# Patient Record
Sex: MALE | Race: WHITE
[De-identification: names, ages, dates, MRNs, and addresses within clinical notes are randomized per-mention and may not be internally consistent; named-entity substitution may affect disease eponyms.]

---

## 2022-05-16 ENCOUNTER — HOSPITAL ENCOUNTER (OUTPATIENT)
Dept: HOSPITAL 47 - RADXRMAIN | Age: 67
Discharge: HOME | End: 2022-05-16
Attending: FAMILY MEDICINE
Payer: MEDICARE

## 2022-05-16 DIAGNOSIS — M17.12: Primary | ICD-10-CM

## 2022-05-16 NOTE — XR
EXAMINATION TYPE: XR knee 4V LT

 

DATE OF EXAM: 5/16/2022

 

COMPARISON: NONE

 

HISTORY: Pain

 

TECHNIQUE:

Three views are submitted.

 

FINDINGS:

There is narrowing of the medial compartment knee joint with hypertrophic spurring. Narrowing of the 
patellofemoral joint with spurring noted. Vascular calcifications noted. Small amount of fluid in the
 suprapatellar bursa..  Osseous structures are intact.  No acute fracture seen.  

 

IMPRESSION:

1. Osteoarthritis.

## 2022-08-12 ENCOUNTER — APPOINTMENT (OUTPATIENT)
Dept: URBAN - NONMETROPOLITAN AREA CLINIC 53 | Age: 67
Setting detail: DERMATOLOGY
End: 2022-08-15

## 2022-08-12 VITALS — WEIGHT: 185 LBS | HEIGHT: 70 IN

## 2022-08-12 DIAGNOSIS — L72.3 SEBACEOUS CYST: ICD-10-CM

## 2022-08-12 DIAGNOSIS — D22 MELANOCYTIC NEVI: ICD-10-CM

## 2022-08-12 DIAGNOSIS — L82.0 INFLAMED SEBORRHEIC KERATOSIS: ICD-10-CM

## 2022-08-12 DIAGNOSIS — D49.2 NEOPLASM OF UNSPECIFIED BEHAVIOR OF BONE, SOFT TISSUE, AND SKIN: ICD-10-CM

## 2022-08-12 DIAGNOSIS — B07.8 OTHER VIRAL WARTS: ICD-10-CM

## 2022-08-12 PROBLEM — D22.5 MELANOCYTIC NEVI OF TRUNK: Status: ACTIVE | Noted: 2022-08-12

## 2022-08-12 PROCEDURE — 17110 DESTRUCT B9 LESION 1-14: CPT

## 2022-08-12 PROCEDURE — OTHER MIPS QUALITY: OTHER

## 2022-08-12 PROCEDURE — OTHER COUNSELING: OTHER

## 2022-08-12 PROCEDURE — 99203 OFFICE O/P NEW LOW 30 MIN: CPT | Mod: 25

## 2022-08-12 PROCEDURE — OTHER BIOPSY BY SHAVE METHOD: OTHER

## 2022-08-12 PROCEDURE — OTHER LIQUID NITROGEN: OTHER

## 2022-08-12 PROCEDURE — 11102 TANGNTL BX SKIN SINGLE LES: CPT | Mod: 59

## 2022-08-12 PROCEDURE — OTHER ADDITIONAL NOTES: OTHER

## 2022-08-12 ASSESSMENT — LOCATION SIMPLE DESCRIPTION DERM
LOCATION SIMPLE: RIGHT RING FINGER
LOCATION SIMPLE: LEFT INDEX FINGER
LOCATION SIMPLE: LEFT MIDDLE FINGER
LOCATION SIMPLE: CHEST
LOCATION SIMPLE: LEFT UPPER ARM
LOCATION SIMPLE: RIGHT HAND
LOCATION SIMPLE: RIGHT UPPER BACK
LOCATION SIMPLE: LEFT HAND
LOCATION SIMPLE: LEFT RING FINGER
LOCATION SIMPLE: LEFT PRETIBIAL REGION

## 2022-08-12 ASSESSMENT — LOCATION ZONE DERM
LOCATION ZONE: TRUNK
LOCATION ZONE: LEG
LOCATION ZONE: HAND
LOCATION ZONE: FINGER
LOCATION ZONE: ARM

## 2022-08-12 ASSESSMENT — LOCATION DETAILED DESCRIPTION DERM
LOCATION DETAILED: RIGHT RADIAL PALM
LOCATION DETAILED: LEFT RADIAL PALM
LOCATION DETAILED: LEFT MEDIAL SUPERIOR CHEST
LOCATION DETAILED: RIGHT SUPERIOR MEDIAL UPPER BACK
LOCATION DETAILED: LEFT PROXIMAL PALMAR RING FINGER
LOCATION DETAILED: LEFT PROXIMAL PALMAR MIDDLE FINGER
LOCATION DETAILED: LEFT DISTAL PALMAR INDEX FINGER
LOCATION DETAILED: RIGHT DISTAL PALMAR RING FINGER
LOCATION DETAILED: LEFT DISTAL PRETIBIAL REGION
LOCATION DETAILED: LEFT PROXIMAL POSTERIOR UPPER ARM

## 2022-08-12 NOTE — HPI: BUMPS
How Severe Are Your Bumps?: mild
Have Your Bumps Been Treated?: been treated
Is This A New Presentation, Or A Follow-Up?: Bump
When Was It Treated?: 2020

## 2022-08-12 NOTE — PROCEDURE: BIOPSY BY SHAVE METHOD
Anesthesia Volume In Cc (Will Not Render If 0): 0.5
Validate Anticipated Plan: No
Anesthesia Type: 1% lidocaine with epinephrine
Size Of Lesion In Cm: 0
Post-Care Instructions: I reviewed with the patient in detail post-care instructions. Patient is to keep the biopsy site dry overnight, and then apply bacitracin twice daily until healed. Patient may apply hydrogen peroxide soaks to remove any crusting.
Wound Care: Petrolatum
Information: Selecting Yes will display possible errors in your note based on the variables you have selected. This validation is only offered as a suggestion for you. PLEASE NOTE THAT THE VALIDATION TEXT WILL BE REMOVED WHEN YOU FINALIZE YOUR NOTE. IF YOU WANT TO FAX A PRELIMINARY NOTE YOU WILL NEED TO TOGGLE THIS TO 'NO' IF YOU DO NOT WANT IT IN YOUR FAXED NOTE.
Biopsy Method: Dermablade
Silver Nitrate Text: The wound bed was treated with silver nitrate after the biopsy was performed.
Curettage Text: The wound bed was treated with curettage after the biopsy was performed.
Dressing: bandage
Billing Type: Third-Party Bill
Cryotherapy Text: The wound bed was treated with cryotherapy after the biopsy was performed.
Biopsy Type: H and E
Type Of Destruction Used: Curettage
Consent: Written consent was obtained and risks were reviewed including but not limited to scarring, infection, bleeding, scabbing, incomplete removal, nerve damage and allergy to anesthesia.
Depth Of Biopsy: dermis
Detail Level: Detailed
Hemostasis: Drysol
Notification Instructions: Patient will be notified of biopsy results. However, patient instructed to call the office if not contacted within 2 weeks.
Electrodesiccation Text: The wound bed was treated with electrodesiccation after the biopsy was performed.
Electrodesiccation And Curettage Text: The wound bed was treated with electrodesiccation and curettage after the biopsy was performed.
Was A Bandage Applied: Yes

## 2022-08-12 NOTE — PROCEDURE: MIPS QUALITY
Quality 130: Documentation Of Current Medications In The Medical Record: Current Medications Documented
Quality 431: Preventive Care And Screening: Unhealthy Alcohol Use - Screening: Patient not identified as an unhealthy alcohol user when screened for unhealthy alcohol use using a systematic screening method
Quality 226: Preventive Care And Screening: Tobacco Use: Screening And Cessation Intervention: Patient screened for tobacco use and is an ex/non-smoker
Detail Level: Detailed
Quality 110: Preventive Care And Screening: Influenza Immunization: Influenza Immunization previously received during influenza season
Quality 111:Pneumonia Vaccination Status For Older Adults: Pneumococcal vaccine administered on or after patient’s 60th birthday and before the end of the measurement period

## 2022-08-12 NOTE — PROCEDURE: ADDITIONAL NOTES
Additional Notes: Talked with pt on removing cyst sack and spoke with him about aftercare of wound.
Render Risk Assessment In Note?: no
Detail Level: Simple

## 2022-10-05 ENCOUNTER — HOSPITAL ENCOUNTER (OUTPATIENT)
Dept: HOSPITAL 47 - RADCTMAIN | Age: 67
Discharge: HOME | End: 2022-10-05
Attending: ORTHOPAEDIC SURGERY
Payer: MEDICARE

## 2022-10-05 DIAGNOSIS — M17.12: Primary | ICD-10-CM

## 2022-10-05 NOTE — CT
EXAMINATION TYPE: CT left knee - [facility] Protocol

 

DATE OF EXAM: 10/5/2022

 

COMPARISON: Plain films 5/16/2022

 

HISTORY: Unilateral primary osteoarthritis LT knee

 

CT DLP: 547.30 mGycm

Automated exposure control for dose reduction was used.

 

Contrast: None

 

Technique: Axial images through the hip and ankle at 2 mm thick sections. 1 mm thick sections were ob
tained through the knee. Procedures performed for NHAN preplanning.

 

FINDINGS: 

Some sacroiliac joint degenerative changes are present. Subchondral cyst formation is within the left
 femoral head. Some degenerative changes at the pubic symphysis.

 

Left knee: AP lateral reconstructed images were obtained. Axial images are reviewed. There is narrowi
ng of the medial compartment joint space. There is mild preservation of the lateral compartment joint
 space. Patellofemoral joint space appears preserved. Medial tibial plateau spurring is present. Mini
mal medial and mild lateral femoral condyle spurring is present.

 

IMPRESSION: 

1. CT FOR PRESURGICAL PLANNING LEFT KNEE

2. DEGENERATIVE CHANGES PREDOMINANTLY WITHIN THE MEDIAL COMPARTMENT LEFT KNEE.

## 2022-11-07 ENCOUNTER — HOSPITAL ENCOUNTER (OUTPATIENT)
Dept: HOSPITAL 47 - LABPAT | Age: 67
Discharge: HOME | End: 2022-11-07
Attending: ORTHOPAEDIC SURGERY
Payer: MEDICARE

## 2022-11-07 DIAGNOSIS — Z01.818: Primary | ICD-10-CM

## 2022-11-07 DIAGNOSIS — Z01.812: ICD-10-CM

## 2022-11-07 LAB
ERYTHROCYTE [DISTWIDTH] IN BLOOD BY AUTOMATED COUNT: 4.53 X 10*6/UL (ref 4.4–5.6)
ERYTHROCYTE [DISTWIDTH] IN BLOOD: 13.8 % (ref 11.5–14.5)
HCT VFR BLD AUTO: 43.7 % (ref 39.6–50)
HGB BLD-MCNC: 14.5 G/DL (ref 13–17)
MCH RBC QN AUTO: 32 PG (ref 27–32)
MCHC RBC AUTO-ENTMCNC: 33.2 G/DL (ref 32–37)
MCV RBC AUTO: 96.5 FL (ref 80–97)
NRBC BLD AUTO-RTO: 0 /100 WBCS (ref 0–0)
PLATELET # BLD AUTO: 573 X 10*3/UL (ref 140–440)
WBC # BLD AUTO: 11.27 X 10*3/UL (ref 4.5–10)

## 2022-11-07 PROCEDURE — 85027 COMPLETE CBC AUTOMATED: CPT

## 2022-11-07 PROCEDURE — 87070 CULTURE OTHR SPECIMN AEROBIC: CPT

## 2022-11-07 PROCEDURE — 81001 URINALYSIS AUTO W/SCOPE: CPT

## 2022-11-08 LAB
PH UR: 7 [PH] (ref 5–8)
SP GR UR: 1 (ref 1–1.03)
UROBILINOGEN UR QL: 0.2

## 2022-11-09 ENCOUNTER — HOSPITAL ENCOUNTER (OUTPATIENT)
Dept: HOSPITAL 47 - OR | Age: 67
Discharge: HOME HEALTH | End: 2022-11-09
Attending: ORTHOPAEDIC SURGERY
Payer: MEDICARE

## 2022-11-09 VITALS — RESPIRATION RATE: 16 BRPM

## 2022-11-09 VITALS — HEART RATE: 52 BPM | DIASTOLIC BLOOD PRESSURE: 70 MMHG | SYSTOLIC BLOOD PRESSURE: 114 MMHG

## 2022-11-09 VITALS — BODY MASS INDEX: 26.1 KG/M2

## 2022-11-09 VITALS — TEMPERATURE: 96.8 F

## 2022-11-09 DIAGNOSIS — G89.18: ICD-10-CM

## 2022-11-09 DIAGNOSIS — M10.9: ICD-10-CM

## 2022-11-09 DIAGNOSIS — I10: ICD-10-CM

## 2022-11-09 DIAGNOSIS — M17.12: Primary | ICD-10-CM

## 2022-11-09 DIAGNOSIS — Z96.652: ICD-10-CM

## 2022-11-09 PROCEDURE — 64999 UNLISTED PX NERVOUS SYSTEM: CPT

## 2022-11-09 PROCEDURE — 27447 TOTAL KNEE ARTHROPLASTY: CPT

## 2022-11-09 PROCEDURE — 73560 X-RAY EXAM OF KNEE 1 OR 2: CPT

## 2022-11-09 PROCEDURE — 97161 PT EVAL LOW COMPLEX 20 MIN: CPT

## 2022-11-09 PROCEDURE — 64447 NJX AA&/STRD FEMORAL NRV IMG: CPT

## 2022-11-09 PROCEDURE — 76942 ECHO GUIDE FOR BIOPSY: CPT

## 2022-11-09 PROCEDURE — 97110 THERAPEUTIC EXERCISES: CPT

## 2022-11-09 PROCEDURE — 88300 SURGICAL PATH GROSS: CPT

## 2022-11-09 RX ADMIN — SODIUM CHLORIDE PRN MG: 9 INJECTION, SOLUTION INTRAVENOUS at 09:00

## 2022-11-09 RX ADMIN — SODIUM CHLORIDE PRN MG: 9 INJECTION, SOLUTION INTRAVENOUS at 08:24

## 2022-11-09 NOTE — P.OP
Date of Procedure: 11/09/22


Preoperative Diagnosis: 


Severe left knee osteoarthritis


Postoperative Diagnosis: 


Same


Procedure(s) Performed: 


Left total knee arthroplasty


Implants: 


1. Marston Triathlon CR Femur Size #5


2. Marston Triathlon Universal Tibial Base Size #5


3. James Triathlon CS poly Size #5, 10-mm


4. Marston Triathlon all poly patella, Size #29


Anesthesia: GETA, regional


Surgeon: Jose Saini


Assistant #1: Stephie Santos


Estimated Blood Loss (ml): 150


IV fluids (ml): 1,100


Pathology: none sent


Condition: stable


Disposition: PACU


Indications for Procedure: 


I met with the patient preoperatively in the office setting and discussed 

treatment of their symptomatic knee arthritis.  They failed a long course of 

nonsurgical treatment and elected to proceed with an elective total knee 

replacement.  I discussed the potential risks and complications at length and 

gave them ample time to ask questions.  Risks discussed included: risks from 

anesthesia, superficial site surgical infection, acute and/or chronic per

iprosthetic joint infection, delayed wound healing, drainage, wound necrosis, 

instability, stiffness, stiffness requiring manipulation and/or revision 

surgery, damage to local blood vessels or nerves, aseptic loosening of the 

implants, extensor mechanism issues including disruption, patellar maltracking, 

avascular necrosis etc., continued or worsened knee pain, generalized 

dissatisfaction with surgical outcome, need for revision surgery, an inability 

to regain preinjury level of function, DVT, PE, other medical complications, and

possibly loss of life or limb.  The patient voiced their understanding that 

while these are the most common complications other less common complications 

are possible.  They provided both their verbal and written consent to go forward

with surgery.


Description of Procedure: 


The patient was identified in preoperative holding and the correct operative 

extremity was verified and marked with a marker.  I reviewed the consent form 

with the patient at length.  All of their questions were answered.  The patient 

was given a block by anesthesia.  They were then brought back to the operating 

room.  They were transferred onto the operating room table where a general 

anesthetic, preoperative antibiotics, and tranexamic acid were administered by 

anesthesia.  A tourniquet was applied to the proximal aspect of the operative 

extremity.  The contralateral extremity was padded under the heel and secured to

the operating room table with a nonsterile blue towel and tape.  The ipsilateral

arm was carefully draped across the patient's chest and secured with a pillow 

and foam.  A post was applied over the lateral aspect of the ipsilateral thigh 

and a bolster was placed under the ipsilateral foot.  I verified that the 

operative extremity was stable and the knee was flexed to 90.  The operative 

extremity was then placed in a leg rossi, nonsterile drapes were applied, and 

the extremity was prepped and draped sterilely in the standard sterile fashion. 

Prior to starting surgery timeout was performed identifying the correct patient,

operative extremity, and procedure.  The leg was then elevated, exsanguinated 

with an Esmarch bandage, and the tourniquet was inflated.





An anterior midline incision was made sharply with a scalpel.  Once I had 

dissected deep to the superficial fascial layer medial and lateral flaps were 

elevated.  A medial parapatellar arthrotomy was created.  Upon opening the knee 

joint there were diffuse arthritic changes in all 3 compartments.  The anterior 

horn of the medial meniscus were sharply released and a medial release was 

performed around the posterior medial corner of the knee to facilitate retractor

placement.  The fat pad was excised with electrocautery.  The patella was found 

to be severely arthritic and a provisional cut was made with a sagittal saw to 

facilitate mobilization of the extensor mechanism during the procedure.  

Remnants of the ACL and PCL were then excised from the notch.  4 mm pins were 

then placed within the incision in the medial distal femur and proximal tibia.  

Arrays were applied to the pins and I verified they were completely tightened.  

The knee was then registered with the [employer] robot and manipulations in implant 

position were made to balance the knee and opitmize implant position.  Using the

Ming robotic saw all cuts were made in accordance with our plan.  After all bony

fragments had been removed the cuts were verified with the planar probe.  The 

tibia was then subluxed forward and sized.  The knee was brought into flexion 

and a lamina  was placed to allow removal of the meniscal remnants both 

medially and laterally as well as posterior osteophytes.  Local anesthetic was 

then infiltrated around the joint capsule.  Trial implants were then placed 

within the knee.  Range of motion and collateral ligament tension was then 

evaluated.  Adjustments in implant size and position were then made accordingly.

 Once the knee was felt to be appropriately balanced the Ming pins were removed.

The patella was then recut, sized, and punched.  A trial patellar button was 

then placed.  With the trial components in place, the patella tracked midline.  

The femur was then drilled and the trial component removed.  The trial tibial 

component was then appropriately rotated, pinned, and prepared for the keel.  

All trial components were then removed from the knee.  The knee was thoroughly 

irrigated with pulsatile lavage.  Cement was prepared via vacuum mixing in a 

bowl on the back table.  I then hand pressurized cement into the femur and tibia

and placed the implants beginning with the tibial base tray and poly liner, 

femoral component, and finally the patellar button.  All extruded cement was 

removed including from the pin sites.  Once the cement had hardened the knee was

evaluated one final time with the final polyethylene liner in place.  The knee 

had full extension and flexion and felt stable to varus and valgus stress 

throughout the arc of motion.  The tourniquet was released and with the 

tourniquet down the patella tracked midline.  All bleeders were controlled with 

electrocautery.  The knee was then soaked for 3 minutes with a dilute Betadine 

soak.  The knee was thoroughly irrigated using 3 L of sterile saline and 

pulsatile lavage.  The extensor mechanism was then reapproximated using pop off 

Vicryl sutures followed by a running barbed suture.  The knee was then closed in

layers with a 0 strata fix for the deep fascial layer, 2-0 strata fix for the 

superficial subcutaneous layer and Monocryl and Steri-Strips for the skin.  A 

sterile dressing was applied.  I verified that all instrument, sponge, and sharp

counts were correct.  The patient was then transferred off the operating room 

table, extubated, and brought to recovery having tolerated the procedure well.





Stephie Santos PA-C was required as a skilled assistant due to the complexity of 

the procedure for patient positioning, draping, retraction, placement of 

hardware, and closure of wound.





PLAN: The patient can weight-bear as tolerated on the operative extremity.  DVT 

prophylaxis with aspirin 81 mg twice a day based on preoperative risk 

stratification.  The patient is going to try and discharge home this afternoon 

as an outpatient.  Follow-up in the office in 2 weeks for wound check and x-rays

of the knee including an AP and lateral.

## 2022-11-09 NOTE — P.ANPRN
Procedure Note - Anesthesia





- Nerve Block Performed


  ** Left Adductor Canal Single


Time Out Performed: Yes


Date of Procedure: 11/09/22


Procedure Start Time: 07:02


Procedure Stop Time: 07:09


Location of Patient: PreOp


Indication: Requested by Surgeon


Sedation Type: Sedate with meaningful contact maintained


Preparation: Sterile Prep


Position: Supine


Needle Types: Pajunk


Needle Gauge: 21


Ultrasound used to visualize needle placement: Yes


Ultrasound used to observe medication spread: Yes


Injectate: 0.5% Ropivacaine (see comment for volume) (25 ml +4 mg dexamethasone)


Blood Aspirated: No


Pain Paresthesia on Injection Noted: No


Resistance on Injection: Normal


Image Stored and Saved: Yes


Events: Uneventful and Well Tolerated

## 2022-11-09 NOTE — P.ANPRN
Procedure Note - Anesthesia





- Nerve Block Performed


  ** Left iPack Single


Time Out Performed: Yes


Date of Procedure: 11/09/22


Procedure Start Time: 07:10


Procedure Stop Time: 07:16


Location of Patient: PreOp


Indication: Requested by Surgeon


Sedation Type: Sedate with meaningful contact maintained


Preparation: Sterile Prep


Position: Right Lateral


Needle Types: Pajunk


Needle Gauge: 21


Ultrasound used to visualize needle placement: Yes


Ultrasound used to observe medication spread: Yes


Injectate: 0.5% Ropivacaine (see comment for volume) (25 ml)


Blood Aspirated: No


Pain Paresthesia on Injection Noted: No


Resistance on Injection: Normal


Image Stored and Saved: Yes


Events: Uneventful and Well Tolerated

## 2022-11-09 NOTE — XR
EXAMINATION TYPE: XR knee limited LT

 

DATE OF EXAM: 11/9/2022

 

COMPARISON: NONE

 

TECHNIQUE: Two views submitted

 

HISTORY: Post op

 

FINDINGS:

There is a prosthetic  knee in near anatomic alignment.  There is soft tissue edema and emphysema. 

 

IMPRESSION:

1. Postoperative change.  Appears in near-anatomic alignment

## 2023-01-13 ENCOUNTER — APPOINTMENT (OUTPATIENT)
Dept: URBAN - NONMETROPOLITAN AREA CLINIC 53 | Age: 68
Setting detail: DERMATOLOGY
End: 2023-01-16

## 2023-01-13 PROBLEM — C44.619 BASAL CELL CARCINOMA OF SKIN OF LEFT UPPER LIMB, INCLUDING SHOULDER: Status: ACTIVE | Noted: 2023-01-13

## 2023-01-13 PROCEDURE — OTHER MIPS QUALITY: OTHER

## 2023-01-13 PROCEDURE — OTHER COUNSELING: OTHER

## 2023-01-13 PROCEDURE — OTHER CURETTAGE AND DESTRUCTION: OTHER

## 2023-01-13 PROCEDURE — 17260 DSTRJ MAL LES T/A/L 0.5 CM/<: CPT

## 2023-01-13 NOTE — PROCEDURE: MIPS QUALITY
Quality 111:Pneumonia Vaccination Status For Older Adults: Pneumococcal vaccine (PPSV23) was not administered on or after patient’s 60th birthday and before the end of the measurement period, reason not otherwise specified
Quality 226: Preventive Care And Screening: Tobacco Use: Screening And Cessation Intervention: Patient screened for tobacco use and is an ex/non-smoker
Detail Level: Detailed
Quality 110: Preventive Care And Screening: Influenza Immunization: Influenza immunization was not ordered or administered, reason not given
Quality 130: Documentation Of Current Medications In The Medical Record: Current Medications Documented

## 2023-01-13 NOTE — PROCEDURE: CURETTAGE AND DESTRUCTION
Add Intralesional Injection: No
Number Of Curettages: 3
Biopsy Photograph Reviewed: Yes
Total Volume (Ccs): 1
Anesthesia Type: 1% lidocaine with epinephrine
Consent was obtained from the patient. The risks, benefits and alternatives to therapy were discussed in detail. Specifically, the risks of infection, scarring, bleeding, prolonged wound healing, nerve injury, incomplete removal, allergy to anesthesia and recurrence were addressed. Alternatives to ED&C, such as: surgical removal and XRT were also discussed.  Prior to the procedure, the treatment site was clearly identified and confirmed by the patient. All components of Universal Protocol/PAUSE Rule completed.
Additional Information: (Optional): The wound was cleaned, and a pressure dressing was applied.  The patient received detailed post-op instructions.
Detail Level: Detailed
What Was Performed First?: Curettage
Bill As A Line Item Or As Units: Line Item
Post-Care Instructions: I reviewed with the patient in detail post-care instructions. Patient is to keep the area dry for 48 hours, and not to engage in any swimming until the area is healed. Should the patient develop any fevers, chills, bleeding, severe pain patient will contact the office immediately.
Size Of Lesion After Curettage: 0
Cautery Type: electrodesiccation
Final Size Statement: The size of the lesion after curettage was

## 2023-07-14 ENCOUNTER — APPOINTMENT (OUTPATIENT)
Dept: URBAN - NONMETROPOLITAN AREA CLINIC 53 | Age: 68
Setting detail: DERMATOLOGY
End: 2023-07-17

## 2023-07-14 DIAGNOSIS — Z12.83 ENCOUNTER FOR SCREENING FOR MALIGNANT NEOPLASM OF SKIN: ICD-10-CM

## 2023-07-14 DIAGNOSIS — D22 MELANOCYTIC NEVI: ICD-10-CM

## 2023-07-14 DIAGNOSIS — Z85.828 PERSONAL HISTORY OF OTHER MALIGNANT NEOPLASM OF SKIN: ICD-10-CM

## 2023-07-14 DIAGNOSIS — Z71.89 OTHER SPECIFIED COUNSELING: ICD-10-CM

## 2023-07-14 DIAGNOSIS — D49.2 NEOPLASM OF UNSPECIFIED BEHAVIOR OF BONE, SOFT TISSUE, AND SKIN: ICD-10-CM

## 2023-07-14 DIAGNOSIS — D18.0 HEMANGIOMA: ICD-10-CM

## 2023-07-14 DIAGNOSIS — L72.0 EPIDERMAL CYST: ICD-10-CM

## 2023-07-14 DIAGNOSIS — L82.0 INFLAMED SEBORRHEIC KERATOSIS: ICD-10-CM

## 2023-07-14 PROBLEM — D18.01 HEMANGIOMA OF SKIN AND SUBCUTANEOUS TISSUE: Status: ACTIVE | Noted: 2023-07-14

## 2023-07-14 PROBLEM — D22.62 MELANOCYTIC NEVI OF LEFT UPPER LIMB, INCLUDING SHOULDER: Status: ACTIVE | Noted: 2023-07-14

## 2023-07-14 PROCEDURE — 99213 OFFICE O/P EST LOW 20 MIN: CPT | Mod: 25

## 2023-07-14 PROCEDURE — 11102 TANGNTL BX SKIN SINGLE LES: CPT | Mod: 59

## 2023-07-14 PROCEDURE — 17110 DESTRUCT B9 LESION 1-14: CPT

## 2023-07-14 PROCEDURE — OTHER ADDITIONAL NOTES: OTHER

## 2023-07-14 PROCEDURE — OTHER LIQUID NITROGEN: OTHER

## 2023-07-14 PROCEDURE — OTHER COUNSELING: OTHER

## 2023-07-14 PROCEDURE — OTHER MIPS QUALITY: OTHER

## 2023-07-14 PROCEDURE — OTHER BIOPSY BY SHAVE METHOD: OTHER

## 2023-07-14 ASSESSMENT — LOCATION ZONE DERM
LOCATION ZONE: TRUNK
LOCATION ZONE: ARM
LOCATION ZONE: FACE

## 2023-07-14 ASSESSMENT — LOCATION SIMPLE DESCRIPTION DERM
LOCATION SIMPLE: LEFT UPPER ARM
LOCATION SIMPLE: CHEST
LOCATION SIMPLE: LEFT FOREHEAD
LOCATION SIMPLE: LEFT UPPER BACK
LOCATION SIMPLE: LEFT SHOULDER

## 2023-07-14 ASSESSMENT — LOCATION DETAILED DESCRIPTION DERM
LOCATION DETAILED: RIGHT LATERAL SUPERIOR CHEST
LOCATION DETAILED: LEFT INFERIOR MEDIAL FOREHEAD
LOCATION DETAILED: LEFT MID-UPPER BACK
LOCATION DETAILED: LEFT POSTERIOR SHOULDER
LOCATION DETAILED: LEFT INFERIOR FOREHEAD
LOCATION DETAILED: LEFT ANTERIOR SHOULDER
LOCATION DETAILED: LEFT ANTERIOR PROXIMAL UPPER ARM

## 2023-07-14 NOTE — PROCEDURE: LIQUID NITROGEN
Add 52 Modifier (Optional): no
Show Topical Anesthesia Variable?: Yes
Consent: The patient's consent was obtained including but not limited to risks of crusting, scabbing, blistering, scarring, darker or lighter pigmentary change, recurrence, incomplete removal and infection.
Medical Necessity Information: It is in your best interest to select a reason for this procedure from the list below. All of these items fulfill various CMS LCD requirements except the new and changing color options.
Detail Level: Detailed
Medical Necessity Clause: This procedure was medically necessary because the lesions that were treated were:
Post-Care Instructions: I reviewed with the patient in detail post-care instructions. Patient is to wear sunprotection, and avoid picking at any of the treated lesions. Pt may apply Vaseline to crusted or scabbing areas.
Spray Paint Text: The liquid nitrogen was applied to the skin utilizing a spray paint frosting technique.

## 2023-09-09 NOTE — PROCEDURE: LIQUID NITROGEN
Post-Care Instructions: I reviewed with the patient in detail post-care instructions. Patient is to wear sunprotection, and avoid picking at any of the treated lesions. Pt may apply Vaseline to crusted or scabbing areas.
Show Aperture Variable?: Yes
Medical Necessity Information: It is in your best interest to select a reason for this procedure from the list below. All of these items fulfill various CMS LCD requirements except the new and changing color options.
Include Z78.9 (Other Specified Conditions Influencing Health Status) As An Associated Diagnosis?: No
Detail Level: Detailed
Spray Paint Text: The liquid nitrogen was applied to the skin utilizing a spray paint frosting technique.
Medical Necessity Clause: This procedure was medically necessary because the lesions that were treated were:
Consent: The patient's consent was obtained including but not limited to risks of crusting, scabbing, blistering, scarring, darker or lighter pigmentary change, recurrence, incomplete removal and infection.
Admission

## 2023-12-01 NOTE — PROCEDURE: ADDITIONAL NOTES
pt awake and alert, acting appropriately for age. VSS. no respiratory distress. cap refill less than 2 sec po motrin given pt tolerated well no distress noted
Detail Level: Simple
Additional Notes: Status post left posterior arm, none melanoma skin cancer, ED C wound site healing well.
Render Risk Assessment In Note?: no
Additional Notes: Reviewed ABCDE of melanoma. Melanoma provided.